# Patient Record
Sex: MALE | Race: OTHER | NOT HISPANIC OR LATINO | URBAN - METROPOLITAN AREA
[De-identification: names, ages, dates, MRNs, and addresses within clinical notes are randomized per-mention and may not be internally consistent; named-entity substitution may affect disease eponyms.]

---

## 2018-11-23 ENCOUNTER — INPATIENT (INPATIENT)
Facility: HOSPITAL | Age: 23
LOS: 2 days | Discharge: HOME | DRG: 286 | End: 2018-11-26
Attending: INTERNAL MEDICINE | Admitting: INTERNAL MEDICINE
Payer: COMMERCIAL

## 2018-11-23 VITALS
WEIGHT: 145.06 LBS | OXYGEN SATURATION: 99 % | TEMPERATURE: 98 F | RESPIRATION RATE: 18 BRPM | SYSTOLIC BLOOD PRESSURE: 114 MMHG | DIASTOLIC BLOOD PRESSURE: 73 MMHG | HEART RATE: 65 BPM

## 2018-11-23 DIAGNOSIS — I51.4 MYOCARDITIS, UNSPECIFIED: ICD-10-CM

## 2018-11-23 DIAGNOSIS — J02.0 STREPTOCOCCAL PHARYNGITIS: ICD-10-CM

## 2018-11-23 DIAGNOSIS — I50.31 ACUTE DIASTOLIC (CONGESTIVE) HEART FAILURE: ICD-10-CM

## 2018-11-23 DIAGNOSIS — I31.9 DISEASE OF PERICARDIUM, UNSPECIFIED: ICD-10-CM

## 2018-11-23 LAB
ALBUMIN SERPL ELPH-MCNC: 4.4 G/DL — SIGNIFICANT CHANGE UP (ref 3.3–5)
ALP SERPL-CCNC: 48 U/L — SIGNIFICANT CHANGE UP (ref 40–120)
ALT FLD-CCNC: 19 U/L — SIGNIFICANT CHANGE UP (ref 10–45)
ANION GAP SERPL CALC-SCNC: 12 MMOL/L — SIGNIFICANT CHANGE UP (ref 5–17)
AST SERPL-CCNC: 93 U/L — HIGH (ref 10–40)
BILIRUB SERPL-MCNC: 0.2 MG/DL — SIGNIFICANT CHANGE UP (ref 0.2–1.2)
BUN SERPL-MCNC: 14 MG/DL — SIGNIFICANT CHANGE UP (ref 7–23)
CALCIUM SERPL-MCNC: 9.1 MG/DL — SIGNIFICANT CHANGE UP (ref 8.4–10.5)
CHLORIDE SERPL-SCNC: 100 MMOL/L — SIGNIFICANT CHANGE UP (ref 96–108)
CK MB CFR SERPL CALC: 76.7 NG/ML — HIGH (ref 0–6.7)
CK SERPL-CCNC: 1051 U/L — HIGH (ref 30–200)
CO2 SERPL-SCNC: 25 MMOL/L — SIGNIFICANT CHANGE UP (ref 22–31)
CREAT SERPL-MCNC: 0.69 MG/DL — SIGNIFICANT CHANGE UP (ref 0.5–1.3)
D DIMER BLD IA.RAPID-MCNC: 178 NG/ML DDU — SIGNIFICANT CHANGE UP
GLUCOSE SERPL-MCNC: 138 MG/DL — HIGH (ref 70–99)
HCT VFR BLD CALC: 39.6 % — SIGNIFICANT CHANGE UP (ref 39–50)
HGB BLD-MCNC: 13.7 G/DL — SIGNIFICANT CHANGE UP (ref 13–17)
MCHC RBC-ENTMCNC: 30.6 PG — SIGNIFICANT CHANGE UP (ref 27–34)
MCHC RBC-ENTMCNC: 34.6 G/DL — SIGNIFICANT CHANGE UP (ref 32–36)
MCV RBC AUTO: 88.6 FL — SIGNIFICANT CHANGE UP (ref 80–100)
PCP SPEC-MCNC: SIGNIFICANT CHANGE UP
PCP SPEC-MCNC: SIGNIFICANT CHANGE UP
PLATELET # BLD AUTO: 252 K/UL — SIGNIFICANT CHANGE UP (ref 150–400)
POTASSIUM SERPL-MCNC: 4.1 MMOL/L — SIGNIFICANT CHANGE UP (ref 3.5–5.3)
POTASSIUM SERPL-SCNC: 4.1 MMOL/L — SIGNIFICANT CHANGE UP (ref 3.5–5.3)
PROT SERPL-MCNC: 8.1 G/DL — SIGNIFICANT CHANGE UP (ref 6–8.3)
RAPID RVP RESULT: SIGNIFICANT CHANGE UP
RBC # BLD: 4.47 M/UL — SIGNIFICANT CHANGE UP (ref 4.2–5.8)
RBC # FLD: 12.9 % — SIGNIFICANT CHANGE UP (ref 10.3–16.9)
S PYO AG SPEC QL IA: NEGATIVE — SIGNIFICANT CHANGE UP
SODIUM SERPL-SCNC: 137 MMOL/L — SIGNIFICANT CHANGE UP (ref 135–145)
TROPONIN T SERPL-MCNC: 0.96 NG/ML — CRITICAL HIGH (ref 0–0.01)
TROPONIN T SERPL-MCNC: 0.98 NG/ML — CRITICAL HIGH (ref 0–0.01)
WBC # BLD: 8.3 K/UL — SIGNIFICANT CHANGE UP (ref 3.8–10.5)
WBC # FLD AUTO: 8.3 K/UL — SIGNIFICANT CHANGE UP (ref 3.8–10.5)

## 2018-11-23 PROCEDURE — 99285 EMERGENCY DEPT VISIT HI MDM: CPT | Mod: 25

## 2018-11-23 PROCEDURE — 99223 1ST HOSP IP/OBS HIGH 75: CPT

## 2018-11-23 PROCEDURE — 93458 L HRT ARTERY/VENTRICLE ANGIO: CPT | Mod: 26

## 2018-11-23 PROCEDURE — 71046 X-RAY EXAM CHEST 2 VIEWS: CPT | Mod: 26

## 2018-11-23 PROCEDURE — 93306 TTE W/DOPPLER COMPLETE: CPT | Mod: 26

## 2018-11-23 RX ORDER — METOPROLOL TARTRATE 50 MG
12.5 TABLET ORAL
Qty: 0 | Refills: 0 | Status: DISCONTINUED | OUTPATIENT
Start: 2018-11-23 | End: 2018-11-24

## 2018-11-23 RX ORDER — INFLUENZA VIRUS VACCINE 15; 15; 15; 15 UG/.5ML; UG/.5ML; UG/.5ML; UG/.5ML
0.5 SUSPENSION INTRAMUSCULAR ONCE
Qty: 0 | Refills: 0 | Status: COMPLETED | OUTPATIENT
Start: 2018-11-23 | End: 2018-11-26

## 2018-11-23 RX ORDER — COLCHICINE 0.6 MG
0.6 TABLET ORAL DAILY
Qty: 0 | Refills: 0 | Status: DISCONTINUED | OUTPATIENT
Start: 2018-11-23 | End: 2018-11-26

## 2018-11-23 RX ORDER — IBUPROFEN 200 MG
600 TABLET ORAL THREE TIMES A DAY
Qty: 0 | Refills: 0 | Status: DISCONTINUED | OUTPATIENT
Start: 2018-11-23 | End: 2018-11-26

## 2018-11-23 RX ADMIN — Medication 1 TABLET(S): at 15:27

## 2018-11-23 RX ADMIN — Medication 600 MILLIGRAM(S): at 21:58

## 2018-11-23 RX ADMIN — Medication 600 MILLIGRAM(S): at 22:12

## 2018-11-23 RX ADMIN — Medication 0.6 MILLIGRAM(S): at 15:27

## 2018-11-23 RX ADMIN — Medication 600 MILLIGRAM(S): at 15:27

## 2018-11-23 NOTE — ED PROVIDER NOTE - CARE PLAN
Principal Discharge DX:	Elevated troponin Principal Discharge DX:	Elevated troponin  Secondary Diagnosis:	Myocarditis

## 2018-11-23 NOTE — PROGRESS NOTE ADULT - SUBJECTIVE AND OBJECTIVE BOX
Procedure: LHC, Radial band  Indication: NSTEMI  Complication: none    Result:  1) Normal coronary arteries  2) LVEDP 14        Plan: Medical management.

## 2018-11-23 NOTE — ED ADULT NURSE NOTE - NSIMPLEMENTINTERV_GEN_ALL_ED
Implemented All Universal Safety Interventions:  [place] to call system. Call bell, personal items and telephone within reach. Instruct patient to call for assistance. Room bathroom lighting operational. Non-slip footwear when patient is off stretcher. Physically safe environment: no spills, clutter or unnecessary equipment. Stretcher in lowest position, wheels locked, appropriate side rails in place.

## 2018-11-23 NOTE — ED PROVIDER NOTE - MEDICAL DECISION MAKING DETAILS
Chest pain, recent strep throat, currently on abx Chest pain, recent strep throat, currently on abx, low risk HEART score but concern for myocarditis and sx, labs drawn,  no hemodynamic stability at present.

## 2018-11-23 NOTE — H&P ADULT - ASSESSMENT
24 yo active male, former hookah smoker, FHX early MI (maternal great grandfather at 51yo), PMHx innocent heart murmur (dx at 10 yo), small PFO (dx at 9yo), with recent multiple episodes of strep throat infections. Pt had strep throat a few weeks ago and consumed 9 days of Augmentin course with resolution of symptoms. Pt then had recurrent symptoms including generalized fatigue, painful swallowing, and white exudates on his tonsils and tested positive for Strep on 11/20/18 and was started on a course of Azithromycin on 11/20/18. Pt has been taking Advil, Dayquil, and Nyquil PRN for symptoms. Pt developed chest pain independent of activity on 11/21/18 for 2 hours. [place] [facility] and had chest pain independent of activity during the flight and came straight to the ED from the airport. In [facility] ED /73. HR 65 RR 18 Temp 97.5 O2 sat 99% room air. EKG shows diffuse early repolarization with TWI in AVL, V1. Trop T 0.98. D dimer negative. CXR shows clear lungs. Rapid Strep A negative. Pt is being admitted to [facility] for presumed myocarditis with plan for cardiac catheterization to assess for CAD. 22 yo active male, former hookah smoker, FHX early MI (maternal great grandfather at 51yo), PMHx innocent heart murmur (dx at 10 yo), small PFO (dx at 11yo), with recent multiple episodes of strep throat infections. Pt had strep throat a few weeks ago and consumed 9 days of Augmentin course with resolution of symptoms. Pt then had recurrent symptoms including generalized fatigue, painful swallowing, and white exudates on his tonsils and tested positive for Strep on 11/20/18 and was started on a course of Azithromycin on 11/20/18. Pt has been taking Advil, Dayquil, and Nyquil PRN for symptoms. Pt developed chest pain independent of activity on 11/21/18 for 2 hours. Pt flew from [place] to [facility] on 11/23/18 and had chest pain independent of activity during the flight and came straight to the ED from the airport. In [facility] ED /73. HR 65 RR 18 Temp 97.5 O2 sat 99% room air. EKG shows diffuse early repolarization with TWI in AVL, V1. Trop T 0.98. D dimer negative. CXR shows clear lungs. Rapid Strep A negative. Pt is being admitted to [facility] for myopericarditis with and now s/p cardiac catheterization on 11/23/18 showing normal coronaries. Echo shows EF 50% and mild apical lateral mid to mid anterolateral wall hypokinesis, therefore Dr Shepard deemed him to have acute diastolic CHF. Pt has frequent PVCs and Lopressor 12.5mg BID started. Pt requires inpatient observation for telemetry monitoring for further arrhythmias.

## 2018-11-23 NOTE — H&P ADULT - PROBLEM SELECTOR PLAN 3
- Currently asymptomatic.  - Stop Azithromycin  - Start Augmentin 875/125mg BID x 10 days  - Rapid strep A negative but pt tested positive on 11/20/18. RVP negative. Pt afebrile, no leukocytosis

## 2018-11-23 NOTE — H&P ADULT - PROBLEM SELECTOR PLAN 2
- Stop Azithromycin  - Start Augmentin 875/125mg BID x 10 days  - Rapid strep A negative but pt tested positive on 11/20/18 - Currently asymptomatic.  - Stop Azithromycin  - Start Augmentin 875/125mg BID x 10 days  - Rapid strep A negative but pt tested positive on 11/20/18. RVP negative. Pt afebrile, no leukocytosis. - Currently asymptomatic.  - Stop Azithromycin  - Start Augmentin 875/125mg BID x 10 days  - Rapid strep A negative but pt tested positive on 11/20/18. RVP negative. Pt afebrile, no leukocytosis -As per Dr Shepard, given wall motion abnormalities pt is classified as having acute diastolic heart failure. Pt euvolemic, doesn't require diuretics at this time.   - Pt having frequent PVCs on telemetry. Started on Lopressor 12.5mg BID as pt's SBPs in 100s. If pt tolerates Lopressor can start Lisinopril 2.5mg or 5mg daily on 11/24/18 afternoon depending on BP

## 2018-11-23 NOTE — ED PROVIDER NOTE - OBJECTIVE STATEMENT
24 yo recently being treated for strep throat, on Zpak with chest pain starting 12 hours ago, pressure like sensation to mid chest, non radiating, occurs at rest and on exertion, no assoc diaphoresis, nausea. 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 sx started after TG dinner yesterday per pt assoc w headache. no rash. no early family hx of cardiac dz, drug use, leg swelling, otherwise PERC neg hx.

## 2018-11-23 NOTE — H&P ADULT - HISTORY OF PRESENT ILLNESS
24 yo male, former hookah smoker, PMHx innocent heart murmur with recent multiple episodes of strep throat infections. Pt had strep throat and consumed 9 days of Augmentin. Pt had recurrent 24 yo active male, former hookah smoker, FHX early MI (maternal great grandfather at 51yo), PMHx innocent heart murmur (dx at 10 yo), small PFO (dx at 11yo), with recent multiple episodes of strep throat infections. Pt had strep throat a few weeks ago and consumed 9 days of Augmentin course with resolution of symptoms. Pt then had recurrent symptoms including generalized fatigue, painful swallowing, and white exudates on his tonsils and tested positive for Strep on 11/20/18 and was started on a course of Azithromycin on 11/20/18. Pt has been taking Advil, Dayquil, and Nyquil PRN for symptoms. Pt developed chest pain independent of activity on 11/21/18 for 2 hours. [place] flew [place] to [facility] on 11/23/18 and had chest pain independent of activity during the flight and came straight to the ED from the airport. In [facility] ED /73. HR 65 RR 18 Temp 97.5 O2 sat 99% room air. EKG shows diffuse early repolarization with TWI in AVL, V1. Trop T 0.98. D dimer negative. CXR shows clear lungs. Rapid Strep A negative. Pt is being admitted to [facility] for presumed myocarditis with plan for cardiac catheterization to assess for CAD. 24 yo active male, former hookah smoker, FHX early MI (maternal great grandfather at 49yo), PMHx innocent heart murmur (dx at 10 yo), small PFO (dx at 11yo), with recent multiple episodes of strep throat infections. Pt had strep throat a few weeks ago and consumed 9 days of Augmentin course with resolution of symptoms. Pt then had recurrent symptoms including generalized fatigue, painful swallowing, and white exudates on his tonsils and tested positive for Strep on 11/20/18 and was started on a course of Azithromycin on 11/20/18. Pt has been taking Advil, Dayquil, and Nyquil PRN for symptoms. Pt developed chest pain independent of activity on 11/21/18 for 2 hours. [place] [facility] [place] and had chest pain independent of activity during the flight and came straight to the ED from the airport. In [facility] ED /73. HR 65 RR 18 Temp 97.5 O2 sat 99% room air. EKG shows diffuse early repolarization with TWI in AVL, V1. Trop T 0.98. D dimer negative. CXR shows clear lungs. Rapid Strep A negative. Pt is being admitted to [facility] for myopericarditis with and now s/p cardiac catheterization on 11/23/18 showing normal coronaries. Echo shows EF 50% and mild apical lateral mid to mid anterolateral wall hypokinesis, therefore Dr Shepard deemed him to have acute diastolic CHF. Pt has frequent PVCs and Lopressor 12.5mg BID started. Pt requires inpatient observation for telemetry monitoring for further arrhythmias.

## 2018-11-23 NOTE — ED ADULT NURSE NOTE - OBJECTIVE STATEMENT
Pt states " I had chest pressure on and off since yesterday. It's not pain, just pressure, SOB and racing heart on and off. I just got back from a flight. It's better now but it keeps going and coming. I am getting over a strep throat and is on z pack so I am not sure if that's the reason". Denies chest pain, n/v. Reports history of Anxiety.

## 2018-11-23 NOTE — H&P ADULT - ATTENDING COMMENTS
Reviewed PA documentation. Reviewed vitals, labs, medications, cardiac studies and imaging 11/23/18. Agree with the above with the following additions/amendments:  23M with PFO and h/o recurrent strep throat presents s/p recent culture +strep URI with new onset chest pain x 2 days. When probed, patient able to describe the chest pain as burning sub sternal, related to activity, relieved with rest or when laying on back or on the side, worsened with sitting up. EKG with diffuse SANDRA. Trop 0.98-->0.96, DDimer negative. CXR negative. Rapid strep neg. Utox negative.  LHC 11/23 reveals clean coronaries and LVEDP 14. Patient being managed as acute myopericarditis.     Ibuprofen 600 TID  Colchicine 0.6mg po daily (dose reduced as pt <70kg)  Metoprolol 12.5 po BID  -->pt with ectopy on telemetry, mildly depressed LVEF and WMA on echo  CTM on telemetry given ectopy  Anticipated discharge 11/24 pending clinical stability    [email or web address]  Cardiology Attending Reviewed PA documentation. Reviewed vitals, labs, medications, cardiac studies and imaging 11/23/18. Agree with the above with the following additions/amendments:  23M with PFO and h/o recurrent strep throat presents s/p recent culture +strep URI with new onset chest pain x 2 days. When probed, patient able to describe the chest pain as burning sub sternal, related to activity, relieved with rest or when laying on back or on the side, worsened with sitting up. EKG with diffuse SANDRA. Trop 0.98-->0.96, DDimer negative. CXR negative. Rapid strep neg. Utox negative.  LHC 11/23 reveals clean coronaries and LVEDP 14. Patient being managed as acute myopericarditis.     Ibuprofen 600 TID  Colchicine 0.6mg po daily (dose reduced as pt <70kg)  Metoprolol 12.5 po BID  -->pt with ectopy on telemetry, mildly depressed LVEF and WMA on echo  Will monitor on BB, then add low dose ACEi  CTM on telemetry given ectopy  Anticipated discharge in next 48-72 hours pending clinical stability    [facility]  Cardiology Attending

## 2018-11-23 NOTE — H&P ADULT - PROBLEM SELECTOR PLAN 1
- currently chest pain free  - trop T 0.98, f/u 1122am trop  - EKG NSR with diffuse J point elevation with TWI in AVL and V1.   - pt for cardiac catheterization on 11/23/18 with Dr Delaney. No load.   - Echo ordered, f/u results - currently chest pain free  - trop T 0.98, f/u 1122am trop  - EKG NSR with diffuse J point elevation with TWI in AVL and V1.   - pt for cardiac catheterization on 11/23/18 with Dr Delaney. No load.   - Echo 11/23/18 Normal LV size and wall thickness. Mild apical lateral mid to mid anterolateral wall hypokinesis. EF 50%. LA/RA/RV normal. Trace to mild TR. PASP 20mmHg. No pericardial effusion. - currently chest pain free  - trop T 0.98 > 0.96.   - EKG NSR with diffuse J point elevation with TWI in AVL and V1.   - pt for cardiac catheterization on 11/23/18 with Dr Delaney. No load.   - Echo 11/23/18 Normal LV size and wall thickness. Mild apical lateral mid to mid anterolateral wall hypokinesis. EF 50%. LA/RA/RV normal. Trace to mild TR. PASP 20mmHg. No pericardial effusion.   -pt s/p diagnostic cardiac cath on 11/23/18 showing normal coronaries. EDP 14mg. Right radial access  -As per Dr Shepard, given wall motion abnormalities pt is classified as having acute diastolic heart failure. Pt euvolemic, doesn't require diuretics at this time.   - Pt having frequent PVCs on telemetry. Started on Lopressor 12.5mg BID as pt's SBPs in 100s. If pt tolerates Lopressor can start Lisinopril 2.5mg or 5mg daily on 11/24/18 afternoon depending on BPs  -Start Ibuprofen 600mg TID and Colchicine 0.6mg daily (pt is less than 70kg).   - first utox (130pm) was indeterminate 2/2 urine pH being elevated. Repeat study ordered. - currently chest pain free  - trop T 0.98 > 0.96.   - EKG NSR with diffuse J point elevation with TWI in AVL and V1.   - pt for cardiac catheterization on 11/23/18 with Dr Delaney. No load.   - Echo 11/23/18 Normal LV size and wall thickness. Mild apical lateral mid to mid anterolateral wall hypokinesis. EF 50%. LA/RA/RV normal. Trace to mild TR. PASP 20mmHg. No pericardial effusion.   -pt s/p diagnostic cardiac cath on 11/23/18 showing normal coronaries. EDP 14mg. Right radial access  - first utox (130pm) was indeterminate 2/2 urine pH being elevated. Repeat study ordered.  -Start Ibuprofen 600mg TID and Colchicine 0.6mg daily (pt is less than 70kg).

## 2018-11-23 NOTE — ED PROVIDER NOTE - PHYSICAL EXAMINATION
CONSTITUTIONAL: Well appearing, well nourished, awake, alert and in no apparent distress.  HEENT: Head is atraumatic. Eyes clear bilaterally, normal EOMI. Airway patent.  CARDIAC: Normal rate, regular rhythm.  Heart sounds S1, S2.   RESPIRATORY: Breath sounds clear and equal bilaterally. no tachypnea, respiratory distress.   GASTROINTESTINAL: Abdomen soft, non-tender, no guarding, distension.  MUSCULOSKELETAL: Spine appears normal, no midline spinal tenderness, range of motion is not limited, no muscle or joint tenderness.    NEUROLOGICAL: Alert and oriented, no focal deficits, no motor or sensory deficits.  SKIN: Skin normal color for race, warm, dry and intact. No evidence of rash.  PSYCHIATRIC: Alert and oriented to person, place, time/situation. normal mood and affect. no apparent risk to self or others.

## 2018-11-23 NOTE — ED ADULT TRIAGE NOTE - CHIEF COMPLAINT QUOTE
pt. c/o midsternal chest pain and shortness of breath since yesterday, pt. just got here this morning from [place].

## 2018-11-24 DIAGNOSIS — I42.9 CARDIOMYOPATHY, UNSPECIFIED: ICD-10-CM

## 2018-11-24 LAB
ALBUMIN SERPL ELPH-MCNC: 4.2 G/DL — SIGNIFICANT CHANGE UP (ref 3.3–5)
ALP SERPL-CCNC: 44 U/L — SIGNIFICANT CHANGE UP (ref 40–120)
ALT FLD-CCNC: 27 U/L — SIGNIFICANT CHANGE UP (ref 10–45)
ANION GAP SERPL CALC-SCNC: 9 MMOL/L — SIGNIFICANT CHANGE UP (ref 5–17)
AST SERPL-CCNC: 139 U/L — HIGH (ref 10–40)
BILIRUB SERPL-MCNC: 0.4 MG/DL — SIGNIFICANT CHANGE UP (ref 0.2–1.2)
BUN SERPL-MCNC: 15 MG/DL — SIGNIFICANT CHANGE UP (ref 7–23)
CALCIUM SERPL-MCNC: 9.2 MG/DL — SIGNIFICANT CHANGE UP (ref 8.4–10.5)
CHLORIDE SERPL-SCNC: 105 MMOL/L — SIGNIFICANT CHANGE UP (ref 96–108)
CO2 SERPL-SCNC: 27 MMOL/L — SIGNIFICANT CHANGE UP (ref 22–31)
CREAT SERPL-MCNC: 0.71 MG/DL — SIGNIFICANT CHANGE UP (ref 0.5–1.3)
GLUCOSE SERPL-MCNC: 106 MG/DL — HIGH (ref 70–99)
HCT VFR BLD CALC: 39.5 % — SIGNIFICANT CHANGE UP (ref 39–50)
HGB BLD-MCNC: 13.2 G/DL — SIGNIFICANT CHANGE UP (ref 13–17)
MAGNESIUM SERPL-MCNC: 2.5 MG/DL — SIGNIFICANT CHANGE UP (ref 1.6–2.6)
MCHC RBC-ENTMCNC: 30 PG — SIGNIFICANT CHANGE UP (ref 27–34)
MCHC RBC-ENTMCNC: 33.4 G/DL — SIGNIFICANT CHANGE UP (ref 32–36)
MCV RBC AUTO: 89.8 FL — SIGNIFICANT CHANGE UP (ref 80–100)
PLATELET # BLD AUTO: 304 K/UL — SIGNIFICANT CHANGE UP (ref 150–400)
POTASSIUM SERPL-MCNC: 4.3 MMOL/L — SIGNIFICANT CHANGE UP (ref 3.5–5.3)
POTASSIUM SERPL-SCNC: 4.3 MMOL/L — SIGNIFICANT CHANGE UP (ref 3.5–5.3)
PROT SERPL-MCNC: 7.1 G/DL — SIGNIFICANT CHANGE UP (ref 6–8.3)
RBC # BLD: 4.4 M/UL — SIGNIFICANT CHANGE UP (ref 4.2–5.8)
RBC # FLD: 13 % — SIGNIFICANT CHANGE UP (ref 10.3–16.9)
SODIUM SERPL-SCNC: 141 MMOL/L — SIGNIFICANT CHANGE UP (ref 135–145)
WBC # BLD: 4.7 K/UL — SIGNIFICANT CHANGE UP (ref 3.8–10.5)
WBC # FLD AUTO: 4.7 K/UL — SIGNIFICANT CHANGE UP (ref 3.8–10.5)

## 2018-11-24 PROCEDURE — 99233 SBSQ HOSP IP/OBS HIGH 50: CPT

## 2018-11-24 RX ORDER — LISINOPRIL 2.5 MG/1
2.5 TABLET ORAL DAILY
Qty: 0 | Refills: 0 | Status: DISCONTINUED | OUTPATIENT
Start: 2018-11-24 | End: 2018-11-24

## 2018-11-24 RX ORDER — METOPROLOL TARTRATE 50 MG
12.5 TABLET ORAL
Qty: 0 | Refills: 0 | Status: DISCONTINUED | OUTPATIENT
Start: 2018-11-24 | End: 2018-11-25

## 2018-11-24 RX ADMIN — Medication 600 MILLIGRAM(S): at 22:29

## 2018-11-24 RX ADMIN — Medication 600 MILLIGRAM(S): at 06:06

## 2018-11-24 RX ADMIN — Medication 600 MILLIGRAM(S): at 21:29

## 2018-11-24 RX ADMIN — Medication 12.5 MILLIGRAM(S): at 14:05

## 2018-11-24 RX ADMIN — Medication 1 TABLET(S): at 06:06

## 2018-11-24 RX ADMIN — Medication 0.6 MILLIGRAM(S): at 12:15

## 2018-11-24 RX ADMIN — Medication 12.5 MILLIGRAM(S): at 22:09

## 2018-11-24 RX ADMIN — Medication 600 MILLIGRAM(S): at 14:02

## 2018-11-24 RX ADMIN — Medication 1 TABLET(S): at 18:19

## 2018-11-24 RX ADMIN — Medication 600 MILLIGRAM(S): at 16:19

## 2018-11-24 RX ADMIN — Medication 600 MILLIGRAM(S): at 08:21

## 2018-11-24 NOTE — PROGRESS NOTE ADULT - SUBJECTIVE AND OBJECTIVE BOX
Interventional Cardiology PA Adult Progress Note    CC: "Chest pain"  Subjective Assessment: Patient seen and examined at bedside this morning. Pt reports mild chest discomfort overnight which was relieved with Ibuprofen. Patient currently denies any active chest pain, SOB, palpitations, dizziness, fever, chills, sore throat, nasal congestion, body aches.    12 Point review of systems otherwise negative except per subjective.       	  MEDICATIONS:  lisinopril 2.5 milliGRAM(s) Oral daily  metoprolol tartrate 12.5 milliGRAM(s) Oral two times a day  amoxicillin  875 milliGRAM(s)/clavulanate 1 Tablet(s) Oral two times a day  ibuprofen  Tablet. 600 milliGRAM(s) Oral three times a day  colchicine 0.6 milliGRAM(s) Oral daily  influenza   Vaccine 0.5 milliLiter(s) IntraMuscular once    [PHYSICAL EXAM:    T(C): 36.7 (11-24-18 @ 09:41), Max: 37.2 (11-23-18 @ 19:06)  HR: 90 (11-24-18 @ 08:07) (61 - 90)  BP: 120/67 (11-24-18 @ 08:07) (85/49 - 120/67)  RR: 16 (11-24-18 @ 08:07) (16 - 18)  SpO2: 98% (11-24-18 @ 08:07) (97% - 98%)  Wt(kg): --  I&O's Summary    23 Nov 2018 07:01  -  24 Nov 2018 07:00  --------------------------------------------------------  IN: 420 mL / OUT: 0 mL / NET: 420 mL    24 Nov 2018 07:01  -  24 Nov 2018 11:21  --------------------------------------------------------  IN: 120 mL / OUT: 0 mL / NET: 120 mL      Height (cm): 170.18 (11-23 @ 12:15)  Weight (kg): 65.7 (11-23 @ 12:15)  BMI (kg/m2): 22.7 (11-23 @ 12:15)  BSA (m2): 1.76 (11-23 @ 12:15)      Gen: NAD, resting comfortable in bed  Neck: No JVD b/l  Cardiac: +S1, S2, RRR  Pulm: CTA b/l  Abdomen: BS present, soft, NT, ND  Extremities: Right radial: no hematoma, no bleed, 2+ radial pulse. No C/C/E b/l, warm to touch  Neuro: A+Ox3, no acute deficits     LABS:	 	                                    13.2   4.7   )-----------( 304      ( 24 Nov 2018 06:05 )             39.5     11-24    141  |  105  |  15  ----------------------------<  106<H>  4.3   |  27  |  0.71    Ca    9.2      24 Nov 2018 06:05  Mg     2.5     11-24    TPro  7.1  /  Alb  4.2  /  TBili  0.4  /  DBili  x   /  AST  139<H>  /  ALT  27  /  AlkPhos  44  11-24          TSH: Thyroid Stimulating Hormone, Serum: 0.804 uIU/mL (11-23 @ 11:22) Interventional Cardiology PA Adult Progress Note    CC: Chest pain, "I want to leave hospital today"  Subjective Assessment: Patient seen and examined at bedside this morning. Pt reports mild chest discomfort overnight which was relieved with Ibuprofen. Patient currently denies any active chest pain, SOB, palpitations, dizziness, fever, chills, sore throat, nasal congestion, body aches.    12 Point review of systems otherwise negative except per subjective.       	  MEDICATIONS:  lisinopril 2.5 milliGRAM(s) Oral daily  metoprolol tartrate 12.5 milliGRAM(s) Oral two times a day  amoxicillin  875 milliGRAM(s)/clavulanate 1 Tablet(s) Oral two times a day  ibuprofen  Tablet. 600 milliGRAM(s) Oral three times a day  colchicine 0.6 milliGRAM(s) Oral daily  influenza   Vaccine 0.5 milliLiter(s) IntraMuscular once    [PHYSICAL EXAM:    T(C): 36.7 (11-24-18 @ 09:41), Max: 37.2 (11-23-18 @ 19:06)  HR: 90 (11-24-18 @ 08:07) (61 - 90)  BP: 120/67 (11-24-18 @ 08:07) (85/49 - 120/67)  RR: 16 (11-24-18 @ 08:07) (16 - 18)  SpO2: 98% (11-24-18 @ 08:07) (97% - 98%)  Wt(kg): --  I&O's Summary    23 Nov 2018 07:01  -  24 Nov 2018 07:00  --------------------------------------------------------  IN: 420 mL / OUT: 0 mL / NET: 420 mL    24 Nov 2018 07:01  -  24 Nov 2018 11:21  --------------------------------------------------------  IN: 120 mL / OUT: 0 mL / NET: 120 mL      Height (cm): 170.18 (11-23 @ 12:15)  Weight (kg): 65.7 (11-23 @ 12:15)  BMI (kg/m2): 22.7 (11-23 @ 12:15)  BSA (m2): 1.76 (11-23 @ 12:15)      Gen: NAD, resting comfortable in bed  Neck: No JVD b/l  Cardiac: +S1, S2, RRR  Pulm: CTA b/l  Abdomen: BS present, soft, NT, ND  Extremities: Right radial: no hematoma, no bleed, 2+ radial pulse. No C/C/E b/l, warm to touch  Neuro: A+Ox3, no acute deficits     LABS:	 	                                    13.2   4.7   )-----------( 304      ( 24 Nov 2018 06:05 )             39.5     11-24    141  |  105  |  15  ----------------------------<  106<H>  4.3   |  27  |  0.71    Ca    9.2      24 Nov 2018 06:05  Mg     2.5     11-24    TPro  7.1  /  Alb  4.2  /  TBili  0.4  /  DBili  x   /  AST  139<H>  /  ALT  27  /  AlkPhos  44  11-24          TSH: Thyroid Stimulating Hormone, Serum: 0.804 uIU/mL (11-23 @ 11:22)

## 2018-11-24 NOTE — PROGRESS NOTE ADULT - PROBLEM SELECTOR PLAN 3
- Currently asymptomatic.  -Was prescribed Azithromycin as outpatient which has been discontinued and Augmentin 875/125 mg BID x 10 days initiated (11/23)  - Rapid strep A negative but pt tested positive on 11/20/18. RVP negative. BCx NGTD.   -Pt afebrile, no leukocytosis.       Dispo: Awaiting Cardiac MRI. Anticipate discharge in 1-2 days as discussed with Dr. Shepard.    Patient and family updated on plan of care. Case discussed with Dr. Shepard - Currently asymptomatic.  -Was prescribed Azithromycin as outpatient which has been discontinued and Augmentin 875/125 mg BID x 10 days initiated (11/23)  - Rapid strep A negative but pt tested positive on 11/20/18. RVP negative. BCx NGTD.   -Group A Streptococcus culture results pending  -Pt afebrile, no leukocytosis.       Dispo: Awaiting Cardiac MRI. Anticipate discharge in 1-2 days as discussed with Dr. Shepard.    Patient and family updated on plan of care. Case discussed with Dr. Shepard

## 2018-11-24 NOTE — PROGRESS NOTE ADULT - PROBLEM SELECTOR PLAN 1
-Currently chest pain free. Did report mild chest discomfort overnight  -Peak troponin 0.98.   - Echo 11/23/18: Mild apical lateral mid to mid anterolateral wall hypokinesis. EF 50%. Trace to mild TR. No pericardial effusion.   -s/p diagnostic cardiac cath 11/23/18: normal coronaries. EDP 14mg via right radial access (pt with NSVT during procedure)  -Ectopy on Telemetry: NSVT   -Plan for Cardiac MRI today.   -Continue Ibuprofen 600 mg orally TID, Colchicine 0.6 mg daily (pt is less than 70 kg day).  -Continue Lopressor 12.5 mg orally BID (will transition to Toprol XL 25 mg daily tomorrow). Lisinopril 2.5 mg initiated today. Will monitor BP trend. -Currently chest pain free. Did report mild chest discomfort overnight  -Peak troponin 0.98.   - Echo 11/23/18: Mild apical lateral mid to mid anterolateral wall hypokinesis. EF 50%. Trace to mild TR. No pericardial effusion.   -s/p diagnostic cardiac cath 11/23/18: normal coronaries. EDP 14mg via right radial access (pt with NSVT during procedure)  -Ectopy on Telemetry: NSVT   -Plan for Cardiac MRI today.   -Continue Ibuprofen 600 mg orally TID, Colchicine 0.6 mg daily (pt is less than 70 kg day).  -Continue Lopressor 12.5 mg orally BID (will transition to Toprol XL 25 mg daily tomorrow if BP tolerates). Will likely initiate low dose Lisinopril 2.5 mg daily pending trending in BP in AM

## 2018-11-24 NOTE — PROGRESS NOTE ADULT - ATTENDING COMMENTS
Reviewed PA documentation. Reviewed vitals, labs, medications, cardiac studies and imaging 11/24/18. Agree with the above with the following additions/amendments:  23M with h/o PFO and h/o recurrent strep throat presents with new onset chest pain x 2 days in context of culture +strep URI . Chest pain is substernal, burning, related to exertional activity, relieved with rest or when laying on back or on the side, worsened with sitting up. EKG with diffuse SNADRA. Trop 0.98-->0.96, DDimer negative. CXR negative. Inpatient rapid strep neg. Utox negative.  LHC 11/23 revealed clean coronaries and LVEDP 14.  TTE with multiple wall motion segmental abnormalities and mildly reduced LVEF 50%.    Patient being managed as acute myopericarditis c/b cardiomyopathy and non sustained ventricular tachycardia, as noted on continuous telemetry monitoring.     NPO for cMRI planned for this afternoon  Ibuprofen 600 TID  Colchicine 0.6mg po daily (dose reduced in pt <70kg)  Metoprolol 12.5 po BID, CTM BPs while on BB  Initiate Lisinopril 2.5mg po daily when NPO status is lifted  CTM on telemetry given NSVT  Anticipated discharge 11/26 at earliest pending clinical/electrical stability     [facility]  Cardiology Attending

## 2018-11-24 NOTE — PROGRESS NOTE ADULT - PROBLEM SELECTOR PLAN 2
-As per Dr Shepard, given wall motion abnormalities and EF 50%, pt is classified as having acute diastolic heart failure. Pt euvolemic, doesn't require diuretics at this time. -Cardiomyopathy secondary myopericarditis   -As per Dr Shepard, given wall motion abnormalities and EF 50%, pt is classified as having acute diastolic heart failure. Pt euvolemic, doesn't require diuretics at this time. 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 Pt euvolemic, doesn't require diuretics at this time. 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 Pt euvolemic, doesn't require diuretics at this time.  -Low dose BB therapy, will likely initiated low dose ACEI therapy in AM pending BP trend.

## 2018-11-25 LAB
ANION GAP SERPL CALC-SCNC: 14 MMOL/L — SIGNIFICANT CHANGE UP (ref 5–17)
BUN SERPL-MCNC: 20 MG/DL — SIGNIFICANT CHANGE UP (ref 7–23)
CALCIUM SERPL-MCNC: 9 MG/DL — SIGNIFICANT CHANGE UP (ref 8.4–10.5)
CHLORIDE SERPL-SCNC: 102 MMOL/L — SIGNIFICANT CHANGE UP (ref 96–108)
CO2 SERPL-SCNC: 25 MMOL/L — SIGNIFICANT CHANGE UP (ref 22–31)
CREAT SERPL-MCNC: 0.69 MG/DL — SIGNIFICANT CHANGE UP (ref 0.5–1.3)
GLUCOSE SERPL-MCNC: 97 MG/DL — SIGNIFICANT CHANGE UP (ref 70–99)
HCT VFR BLD CALC: 38 % — LOW (ref 39–50)
HGB BLD-MCNC: 12.4 G/DL — LOW (ref 13–17)
MAGNESIUM SERPL-MCNC: 2.4 MG/DL — SIGNIFICANT CHANGE UP (ref 1.6–2.6)
MCHC RBC-ENTMCNC: 29.4 PG — SIGNIFICANT CHANGE UP (ref 27–34)
MCHC RBC-ENTMCNC: 32.6 G/DL — SIGNIFICANT CHANGE UP (ref 32–36)
MCV RBC AUTO: 90 FL — SIGNIFICANT CHANGE UP (ref 80–100)
PLATELET # BLD AUTO: 321 K/UL — SIGNIFICANT CHANGE UP (ref 150–400)
POTASSIUM SERPL-MCNC: 4 MMOL/L — SIGNIFICANT CHANGE UP (ref 3.5–5.3)
POTASSIUM SERPL-SCNC: 4 MMOL/L — SIGNIFICANT CHANGE UP (ref 3.5–5.3)
RBC # BLD: 4.22 M/UL — SIGNIFICANT CHANGE UP (ref 4.2–5.8)
RBC # FLD: 13.1 % — SIGNIFICANT CHANGE UP (ref 10.3–16.9)
SODIUM SERPL-SCNC: 141 MMOL/L — SIGNIFICANT CHANGE UP (ref 135–145)
WBC # BLD: 5.5 K/UL — SIGNIFICANT CHANGE UP (ref 3.8–10.5)
WBC # FLD AUTO: 5.5 K/UL — SIGNIFICANT CHANGE UP (ref 3.8–10.5)

## 2018-11-25 PROCEDURE — 75561 CARDIAC MRI FOR MORPH W/DYE: CPT | Mod: 26

## 2018-11-25 PROCEDURE — 99233 SBSQ HOSP IP/OBS HIGH 50: CPT

## 2018-11-25 RX ORDER — LISINOPRIL 2.5 MG/1
2.5 TABLET ORAL DAILY
Qty: 0 | Refills: 0 | Status: DISCONTINUED | OUTPATIENT
Start: 2018-11-25 | End: 2018-11-26

## 2018-11-25 RX ORDER — METOPROLOL TARTRATE 50 MG
6.25 TABLET ORAL
Qty: 0 | Refills: 0 | Status: DISCONTINUED | OUTPATIENT
Start: 2018-11-25 | End: 2018-11-26

## 2018-11-25 RX ORDER — SODIUM CHLORIDE 9 MG/ML
1000 INJECTION INTRAMUSCULAR; INTRAVENOUS; SUBCUTANEOUS
Qty: 0 | Refills: 0 | Status: DISCONTINUED | OUTPATIENT
Start: 2018-11-25 | End: 2018-11-26

## 2018-11-25 RX ADMIN — Medication 1 TABLET(S): at 06:03

## 2018-11-25 RX ADMIN — Medication 12.5 MILLIGRAM(S): at 06:03

## 2018-11-25 RX ADMIN — Medication 1 TABLET(S): at 17:12

## 2018-11-25 RX ADMIN — Medication 6.25 MILLIGRAM(S): at 17:36

## 2018-11-25 RX ADMIN — SODIUM CHLORIDE 75 MILLILITER(S): 9 INJECTION INTRAMUSCULAR; INTRAVENOUS; SUBCUTANEOUS at 09:25

## 2018-11-25 RX ADMIN — Medication 600 MILLIGRAM(S): at 06:03

## 2018-11-25 RX ADMIN — Medication 600 MILLIGRAM(S): at 14:07

## 2018-11-25 RX ADMIN — LISINOPRIL 2.5 MILLIGRAM(S): 2.5 TABLET ORAL at 21:26

## 2018-11-25 RX ADMIN — Medication 0.6 MILLIGRAM(S): at 11:50

## 2018-11-25 RX ADMIN — Medication 600 MILLIGRAM(S): at 22:26

## 2018-11-25 RX ADMIN — Medication 600 MILLIGRAM(S): at 16:23

## 2018-11-25 RX ADMIN — Medication 600 MILLIGRAM(S): at 07:03

## 2018-11-25 RX ADMIN — Medication 600 MILLIGRAM(S): at 21:26

## 2018-11-25 NOTE — PROGRESS NOTE ADULT - PROBLEM SELECTOR PLAN 1
-Currently chest pain free. Did report mild chest discomfort overnight  -Peak troponin 0.98.   - Echo 11/23/18: Mild apical lateral mid to mid anterolateral wall hypokinesis. EF 50%. Trace to mild TR. No pericardial effusion.   -s/p diagnostic cardiac cath 11/23/18: normal coronaries. EDP 14mg via right radial access (pt with NSVT during procedure)  -Ectopy on Telemetry: NSVT   -Plan for Cardiac MRI today. f/u MRI  -Continue Ibuprofen 600 mg orally TID, Colchicine 0.6 mg daily (pt is less than 70 kg day).  -Continue Lopressor 12.5 mg orally BID (will transition to Toprol XL 25 mg daily tomorrow if BP tolerates).   - pt. to be started on lisnopril 2.5 mg today evening if BP stable -Currently chest pain free. Did report mild chest discomfort overnight  -Peak troponin 0.98.   - Echo 11/23/18: Mild apical lateral mid to mid anterolateral wall hypokinesis. EF 50%. Trace to mild TR. No pericardial effusion.   -s/p diagnostic cardiac cath 11/23/18: normal coronaries. EDP 14mg via right radial access (pt with NSVT during procedure)  -Ectopy on Telemetry: NSVT   -Plan for Cardiac MRI today. f/u MRI  -Continue Ibuprofen 600 mg orally TID, Colchicine 0.6 mg daily (pt is less than 70 kg day).  - Lopressor 12.5 mg orally BID decreased to 6.25 mg BID.   - pt. to be started on lisnopril 2.5 mg today evening if BP stable

## 2018-11-25 NOTE — PROGRESS NOTE ADULT - PROBLEM SELECTOR PLAN 3
- Currently asymptomatic.  -Was prescribed Azithromycin as outpatient which has been discontinued and Augmentin 875/125 mg BID x 10 days initiated (11/23)  - Rapid strep A negative but pt tested positive on 11/20/18. RVP negative. BCx NGTD.   -Group A Streptococcus culture results pending  -Pt afebrile, no leukocytosis.     IVF NS @ 75 cc/h X 10 hrs today as pt. has been NPO for past few days.   Dispo:  f/u cardiac MRI    Patient and family updated on plan of care. Case discussed with Dr. Shepard

## 2018-11-25 NOTE — PROGRESS NOTE ADULT - SUBJECTIVE AND OBJECTIVE BOX
Interventional Cardiology PA Adult Progress Note    CC: chest pain  Subjective Assessment: Pt. seen and examined at bedside today am. Pt. sleeping comfortably, denies any CP, SOB, dizziness and palpitations.    12 ROS negative except as per subjective HPI.   	  MEDICATIONS:  metoprolol tartrate 12.5 milliGRAM(s) Oral two times a day    amoxicillin  875 milliGRAM(s)/clavulanate 1 Tablet(s) Oral two times a day      ibuprofen  Tablet. 600 milliGRAM(s) Oral three times a day      colchicine 0.6 milliGRAM(s) Oral daily    influenza   Vaccine 0.5 milliLiter(s) IntraMuscular once      	    [PHYSICAL EXAM:  TELEMETRY:  T(C): 37 (11-25-18 @ 07:08), Max: 37.4 (11-24-18 @ 18:06)  HR: 70 (11-25-18 @ 08:54) (64 - 76)  BP: 93/54 (11-25-18 @ 08:54) (90/50 - 105/51)  RR: 16 (11-25-18 @ 08:54) (16 - 16)  SpO2: 95% (11-25-18 @ 08:54) (95% - 98%)  Wt(kg): --  I&O's Summary    24 Nov 2018 07:01  -  25 Nov 2018 07:00  --------------------------------------------------------  IN: 650 mL / OUT: 0 mL / NET: 650 mL        Mcnamara:  Central/PICC/Mid Line:                                         General: NAD  Neck: no JVD noted  CV: RRR, s1 and s2 positive, no murmurs noted  Pulm: CTA B/L, no w/r/r  GI: soft, NT/ND, + BS X 4  extremities: no clubbing, cyanosis and edema noted b/l  Neuro: AO X 3, no focal deficits noted      	    ECG:  	  RADIOLOGY:   DIAGNOSTIC TESTING:  [ ] Echocardiogram:  [ ]  Catheterization:  [ ] Stress Test:    [ ] RIO  OTHER: 	    LABS:	 	  CARDIAC MARKERS:                                  12.4   5.5   )-----------( 321      ( 25 Nov 2018 06:50 )             38.0     11-25    141  |  102  |  20  ----------------------------<  97  4.0   |  25  |  0.69    Ca    9.0      25 Nov 2018 06:50  Mg     2.4     11-25    TPro  7.1  /  Alb  4.2  /  TBili  0.4  /  DBili  x   /  AST  139<H>  /  ALT  27  /  AlkPhos  44  11-24    proBNP:   Lipid Profile:   HgA1c:   TSH:       ASSESSMENT/PLAN: 	        DVT ppx:  Dispo:

## 2018-11-25 NOTE — PROGRESS NOTE ADULT - ATTENDING COMMENTS
Reviewed PA documentation. Reviewed vitals, labs, medications, cardiac studies and imaging 11/24/18. Agree with the above with the following additions/amendments:  23M with h/o PFO and h/o recurrent strep throat presents with new onset chest pain x 2 days in context of culture +strep URI . Chest pain is substernal, burning, related to exertional activity, relieved with rest or when laying on back or on the side, worsened with sitting up. EKG with diffuse SANDRA. Trop 0.98-->0.96, DDimer negative. CXR negative. Inpatient rapid strep neg. Utox negative.  LHC 11/23 revealed clean coronaries and LVEDP 14.  TTE with multiple wall motion segmental abnormalities and mildly reduced LVEF 50%.    Telemetry reviewed: 4bts NSVT, 3bts NSVT  Patient being managed as acute myopericarditis c/b cardiomyopathy and non sustained ventricular tachycardia, as noted on continuous telemetry monitoring. cMRI was rescheduled from yesterday to today 11/25.    NPO for cMRI planned for this morning   Ibuprofen 600 TID  Colchicine 0.6mg po daily (dose reduced in pt <70kg)  Metoprolol 12.5 po BID, CTM BPs while on BB  -->Will discharge on Toprol 12.5XL daily  Initiate Lisinopril 2.5mg po daily when NPO status is lifted this afternoon  Trend LFTs given elevation in transaminases  Gentle IVFs 75cc/hr given NPO x 3D  CTM on telemetry  Anticipated discharge 11/26PM at earliest pending clinical/electrical stability     [facility]  [facility] Attending Reviewed PA documentation. Reviewed vitals, labs, medications, cardiac studies and imaging 11/24/18. Agree with the above with the following additions/amendments:  23M with h/o PFO and h/o recurrent strep throat presents with new onset chest pain x 2 days in context of culture +strep URI . Chest pain is substernal, burning, related to exertional activity, relieved with rest or when laying on back or on the side, worsened with sitting up. EKG with diffuse SANDRA. Trop 0.98-->0.96, DDimer negative. CXR negative. Inpatient rapid strep neg. Utox negative.  LHC 11/23 revealed clean coronaries and LVEDP 14.  TTE with multiple wall motion segmental abnormalities and mildly reduced LVEF 50%.    Telemetry reviewed: 4bts NSVT, 3bts NSVT  Patient being managed as acute myopericarditis c/b cardiomyopathy and non sustained ventricular tachycardia, as noted on continuous telemetry monitoring. cMRI was rescheduled from yesterday to today 11/25.    NPO for cMRI planned for this morning   Ibuprofen 600 TID  Colchicine 0.6mg po daily (dose reduced in pt <70kg)  Dose reduce to Metoprolol 6.25 BID, CTM BPs while on BB  -->Will discharge on Toprol 12.5XL daily  Initiate Lisinopril 2.5mg po daily when NPO status is lifted this afternoon  Trend LFTs given elevation in transaminases  Gentle IVFs 75cc/hr given NPO x 3D  CTM on telemetry  Anticipated discharge 11/26PM at earliest pending clinical/electrical stability     [facility]  Cardiology Attending Reviewed PA documentation. Reviewed vitals, labs, medications, cardiac studies and imaging 11/25/18. Agree with the above with the following additions/amendments:  23M with h/o PFO and h/o recurrent strep throat presents with new onset chest pain x 2 days in context of culture +strep URI . Chest pain is substernal, burning, related to exertional activity, relieved with rest or when laying on back or on the side, worsened with sitting up. EKG with diffuse SANDRA. Trop 0.98-->0.96, DDimer negative. CXR negative. Inpatient rapid strep neg. Utox negative.  LHC 11/23 revealed clean coronaries and LVEDP 14.  TTE with multiple wall motion segmental abnormalities and mildly reduced LVEF 50%.    Telemetry reviewed: 4bts NSVT, 3bts NSVT  Patient being managed as acute myopericarditis c/b cardiomyopathy and non sustained ventricular tachycardia, as noted on continuous telemetry monitoring. cMRI was rescheduled from yesterday to today 11/25.    NPO for cMRI planned for this morning   Ibuprofen 600 TID  Colchicine 0.6mg po daily (dose reduced in pt <70kg)  Dose reduce to Metoprolol 6.25 BID, CTM BPs while on BB  -->Will discharge on Toprol 12.5XL daily  Initiate Lisinopril 2.5mg po daily when NPO status is lifted this afternoon  Trend LFTs given elevation in transaminases  Gentle IVFs 75cc/hr given NPO x 3D  CTM on telemetry  Anticipated discharge 11/26PM at earliest pending clinical/electrical stability     [facility]  Cardiology Attending

## 2018-11-25 NOTE — PROGRESS NOTE ADULT - ASSESSMENT
23 male, visiting from [place], with a hx of PFO, recurrent strep throat s/p recent culture +strep presented to [facility] ED (11/23) with new onset chest pain x two days. 12 lead EKG revealed diffuse SANDRA, peak troponin 0.98, Rapid strep neg and C 11/23 revealed clean coronaries, LVEDP 14 mmHG. Pt is currently being managed for acute myopericarditis and is awaiting cardiac MRI.
23 male, visiting from [place], with a hx of PFO, recurrent strep throat s/p recent culture +strep presented to [facility] ED (11/23) with new onset chest pain x two days. 12 lead EKG revealed diffuse SANDRA, peak troponin 0.98, Rapid strep neg and C 11/23 revealed clean coronaries, LVEDP 14 mmHG. Pt is currently being managed for acute myopericarditis and is awaiting cardiac MRI.

## 2018-11-25 NOTE — PROGRESS NOTE ADULT - PROBLEM SELECTOR PLAN 2
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 Pt euvolemic, doesn't require diuretics at this time.  -Low dose BB therapy  - - pt. to be started on lisnopril 2.5 mg today evening if BP stable -Cardiomyopathy secondary to myopericarditis   Pt euvolemic, doesn't require diuretics at this time.  -Low dose BB therapy  - - pt. to be started on lisnopril 2.5 mg today evening if BP stable

## 2018-11-25 NOTE — PROGRESS NOTE ADULT - NSHPATTENDINGPLANDISCUSS_GEN_ALL_CORE
the patient, his family, and 5U care team
the patient, his mother and extended family, and 5U care team

## 2018-11-26 ENCOUNTER — TRANSCRIPTION ENCOUNTER (OUTPATIENT)
Age: 23
End: 2018-11-26

## 2018-11-26 ENCOUNTER — INBOUND DOCUMENT (OUTPATIENT)
Age: 23
End: 2018-11-26

## 2018-11-26 VITALS — TEMPERATURE: 98 F

## 2018-11-26 PROBLEM — Z00.00 ENCOUNTER FOR PREVENTIVE HEALTH EXAMINATION: Status: ACTIVE | Noted: 2018-11-26

## 2018-11-26 PROBLEM — J02.0 STREPTOCOCCAL PHARYNGITIS: Chronic | Status: ACTIVE | Noted: 2018-11-23

## 2018-11-26 LAB
ANION GAP SERPL CALC-SCNC: 9 MMOL/L — SIGNIFICANT CHANGE UP (ref 5–17)
BUN SERPL-MCNC: 23 MG/DL — SIGNIFICANT CHANGE UP (ref 7–23)
CALCIUM SERPL-MCNC: 8.7 MG/DL — SIGNIFICANT CHANGE UP (ref 8.4–10.5)
CHLORIDE SERPL-SCNC: 107 MMOL/L — SIGNIFICANT CHANGE UP (ref 96–108)
CO2 SERPL-SCNC: 24 MMOL/L — SIGNIFICANT CHANGE UP (ref 22–31)
CREAT SERPL-MCNC: 0.68 MG/DL — SIGNIFICANT CHANGE UP (ref 0.5–1.3)
GLUCOSE SERPL-MCNC: 108 MG/DL — HIGH (ref 70–99)
HCT VFR BLD CALC: 36 % — LOW (ref 39–50)
HGB BLD-MCNC: 11.8 G/DL — LOW (ref 13–17)
MAGNESIUM SERPL-MCNC: 2.3 MG/DL — SIGNIFICANT CHANGE UP (ref 1.6–2.6)
MCHC RBC-ENTMCNC: 29.8 PG — SIGNIFICANT CHANGE UP (ref 27–34)
MCHC RBC-ENTMCNC: 32.8 G/DL — SIGNIFICANT CHANGE UP (ref 32–36)
MCV RBC AUTO: 90.9 FL — SIGNIFICANT CHANGE UP (ref 80–100)
PLATELET # BLD AUTO: 382 K/UL — SIGNIFICANT CHANGE UP (ref 150–400)
POTASSIUM SERPL-MCNC: 4.4 MMOL/L — SIGNIFICANT CHANGE UP (ref 3.5–5.3)
POTASSIUM SERPL-SCNC: 4.4 MMOL/L — SIGNIFICANT CHANGE UP (ref 3.5–5.3)
RBC # BLD: 3.96 M/UL — LOW (ref 4.2–5.8)
RBC # FLD: 12.8 % — SIGNIFICANT CHANGE UP (ref 10.3–16.9)
SODIUM SERPL-SCNC: 140 MMOL/L — SIGNIFICANT CHANGE UP (ref 135–145)
WBC # BLD: 6.6 K/UL — SIGNIFICANT CHANGE UP (ref 3.8–10.5)
WBC # FLD AUTO: 6.6 K/UL — SIGNIFICANT CHANGE UP (ref 3.8–10.5)

## 2018-11-26 PROCEDURE — 80076 HEPATIC FUNCTION PANEL: CPT

## 2018-11-26 PROCEDURE — 71046 X-RAY EXAM CHEST 2 VIEWS: CPT

## 2018-11-26 PROCEDURE — 85379 FIBRIN DEGRADATION QUANT: CPT

## 2018-11-26 PROCEDURE — 87798 DETECT AGENT NOS DNA AMP: CPT

## 2018-11-26 PROCEDURE — 80053 COMPREHEN METABOLIC PANEL: CPT

## 2018-11-26 PROCEDURE — 80307 DRUG TEST PRSMV CHEM ANLYZR: CPT

## 2018-11-26 PROCEDURE — 87581 M.PNEUMON DNA AMP PROBE: CPT

## 2018-11-26 PROCEDURE — 80048 BASIC METABOLIC PNL TOTAL CA: CPT

## 2018-11-26 PROCEDURE — 82553 CREATINE MB FRACTION: CPT

## 2018-11-26 PROCEDURE — 99239 HOSP IP/OBS DSCHRG MGMT >30: CPT

## 2018-11-26 PROCEDURE — 84484 ASSAY OF TROPONIN QUANT: CPT

## 2018-11-26 PROCEDURE — C1894: CPT

## 2018-11-26 PROCEDURE — 82550 ASSAY OF CK (CPK): CPT

## 2018-11-26 PROCEDURE — 84443 ASSAY THYROID STIM HORMONE: CPT

## 2018-11-26 PROCEDURE — C1887: CPT

## 2018-11-26 PROCEDURE — C8929: CPT

## 2018-11-26 PROCEDURE — 87880 STREP A ASSAY W/OPTIC: CPT

## 2018-11-26 PROCEDURE — 87081 CULTURE SCREEN ONLY: CPT

## 2018-11-26 PROCEDURE — A9577: CPT

## 2018-11-26 PROCEDURE — 99285 EMERGENCY DEPT VISIT HI MDM: CPT

## 2018-11-26 PROCEDURE — 87040 BLOOD CULTURE FOR BACTERIA: CPT

## 2018-11-26 PROCEDURE — 87633 RESP VIRUS 12-25 TARGETS: CPT

## 2018-11-26 PROCEDURE — 80061 LIPID PANEL: CPT

## 2018-11-26 PROCEDURE — C1769: CPT

## 2018-11-26 PROCEDURE — 90686 IIV4 VACC NO PRSV 0.5 ML IM: CPT

## 2018-11-26 PROCEDURE — 75561 CARDIAC MRI FOR MORPH W/DYE: CPT

## 2018-11-26 PROCEDURE — 87486 CHLMYD PNEUM DNA AMP PROBE: CPT

## 2018-11-26 PROCEDURE — 83735 ASSAY OF MAGNESIUM: CPT

## 2018-11-26 PROCEDURE — 85027 COMPLETE CBC AUTOMATED: CPT

## 2018-11-26 PROCEDURE — 83036 HEMOGLOBIN GLYCOSYLATED A1C: CPT

## 2018-11-26 PROCEDURE — 36415 COLL VENOUS BLD VENIPUNCTURE: CPT

## 2018-11-26 RX ORDER — COLCHICINE 0.6 MG
1 TABLET ORAL
Qty: 30 | Refills: 0 | OUTPATIENT
Start: 2018-11-26 | End: 2018-12-25

## 2018-11-26 RX ORDER — METOPROLOL TARTRATE 50 MG
0.5 TABLET ORAL
Qty: 15 | Refills: 0 | OUTPATIENT
Start: 2018-11-26 | End: 2018-12-25

## 2018-11-26 RX ORDER — PANTOPRAZOLE SODIUM 20 MG/1
40 TABLET, DELAYED RELEASE ORAL
Qty: 0 | Refills: 0 | Status: DISCONTINUED | OUTPATIENT
Start: 2018-11-26 | End: 2018-11-26

## 2018-11-26 RX ORDER — IBUPROFEN 200 MG
1 TABLET ORAL
Qty: 30 | Refills: 0 | OUTPATIENT
Start: 2018-11-26 | End: 2018-12-05

## 2018-11-26 RX ORDER — AZITHROMYCIN 500 MG/1
1 TABLET, FILM COATED ORAL
Qty: 0 | Refills: 0 | COMMUNITY

## 2018-11-26 RX ORDER — LISINOPRIL 2.5 MG/1
1 TABLET ORAL
Qty: 30 | Refills: 0 | OUTPATIENT
Start: 2018-11-26 | End: 2018-12-25

## 2018-11-26 RX ORDER — PANTOPRAZOLE SODIUM 20 MG/1
1 TABLET, DELAYED RELEASE ORAL
Qty: 30 | Refills: 0 | OUTPATIENT
Start: 2018-11-26 | End: 2018-12-25

## 2018-11-26 RX ORDER — METOPROLOL TARTRATE 50 MG
12.5 TABLET ORAL DAILY
Qty: 0 | Refills: 0 | Status: DISCONTINUED | OUTPATIENT
Start: 2018-11-26 | End: 2018-11-26

## 2018-11-26 RX ADMIN — INFLUENZA VIRUS VACCINE 0.5 MILLILITER(S): 15; 15; 15; 15 SUSPENSION INTRAMUSCULAR at 10:56

## 2018-11-26 RX ADMIN — PANTOPRAZOLE SODIUM 40 MILLIGRAM(S): 20 TABLET, DELAYED RELEASE ORAL at 10:50

## 2018-11-26 RX ADMIN — LISINOPRIL 2.5 MILLIGRAM(S): 2.5 TABLET ORAL at 09:59

## 2018-11-26 RX ADMIN — Medication 1 TABLET(S): at 05:51

## 2018-11-26 RX ADMIN — Medication 0.6 MILLIGRAM(S): at 11:00

## 2018-11-26 RX ADMIN — Medication 600 MILLIGRAM(S): at 05:51

## 2018-11-26 RX ADMIN — Medication 6.25 MILLIGRAM(S): at 05:51

## 2018-11-26 RX ADMIN — Medication 600 MILLIGRAM(S): at 13:36

## 2018-11-26 NOTE — DISCHARGE NOTE ADULT - CARE PLAN
Principal Discharge DX:	Myopericarditis  Goal:	MD follow-up, Medication compliance  Assessment and plan of treatment:	See Dr. Vivas on 11/27/18 at 3:50PM. Dr. Vivas will give you clearance to fly to [place] on 11/30/18 if all is well. See cardiologist Dr. Emmanuel Grier between 12/3/18 and 12/5/18.  Continue Colchicine and Ibuprofen as directed.   If chest pain, shortness of breath, dizziness noted call MD immediately and seek medical attention as directed     Monitor right wrist/arm for swelling, bleeding, discharge, pain or skin discoloration if any of these findings are noted go to nearest ER, seek medical attention immediately and call [phone] if any questions.  Secondary Diagnosis:	Cardiomyopathy  Assessment and plan of treatment:	Ejection fraction improved to 57% by Cardiac MRI. Prescribed Toprol XL (Metoprolol) and Lisinopril. Can stagger these medications taking one in the morning and one in the evening. If dizziness or lightheadedness is noted gently sit or lay down where safe, call MD immediately, and seek medical attention as directed  Secondary Diagnosis:	Strep throat  Assessment and plan of treatment:	Completed 3 days of Augmentin. Prescribed an additional 7 days. Total of 10 days. If fever or chills, worsening sore throat or cough noted call MD immediately and seek medical attention as directed  Secondary Diagnosis:	Transaminitis  Assessment and plan of treatment:	One of your liver function blood tests was elevated and is improved. Repeat Liver Function Blood Tests on follow-up with Primary Doctor. Avoid alcohol use Principal Discharge DX:	Myopericarditis  Goal:	MD follow-up, Medication compliance  Assessment and plan of treatment:	See Dr. Vivas on 11/27/18 at 3:50PM. Dr. Vivas will give you clearance to fly to [place] on 11/30/18 if all is well. See cardiologist Dr. Emmanuel Grier between 12/3/18 and 12/5/18.  Continue Colchicine and Ibuprofen as directed.   You are not cleared to exercise or return to work or undertake exertional activity such as running or intimacy for 1 month at this time and until clearance by your cardiologist.    If chest pain, shortness of breath, dizziness noted call MD immediately and seek medical attention as directed.   Monitor right wrist/arm for swelling, bleeding, discharge, pain or skin discoloration if any of these findings are noted go to nearest ER, seek medical attention immediately and call [phone] if any questions.  Secondary Diagnosis:	Cardiomyopathy  Assessment and plan of treatment:	Ejection fraction improved to 57% by Cardiac MRI. Prescribed Toprol XL (Metoprolol) and Lisinopril. Can stagger these medications taking one in the morning and one in the evening. If dizziness or lightheadedness is noted gently sit or lay down where safe, call MD immediately, and seek medical attention as directed  Secondary Diagnosis:	Strep throat  Assessment and plan of treatment:	Completed 3 days of Augmentin. Prescribed an additional 7 days. Total of 10 days. If fever or chills, worsening sore throat or cough noted call MD immediately and seek medical attention as directed  Secondary Diagnosis:	Transaminitis  Assessment and plan of treatment:	One of your liver function blood tests was elevated and is improved. Repeat Liver Function Blood Tests on follow-up with Primary Doctor. Avoid alcohol use

## 2018-11-26 NOTE — DIETITIAN INITIAL EVALUATION ADULT. - PROBLEM SELECTOR PLAN 1
- currently chest pain free  - trop T 0.98 > 0.96.   - EKG NSR with diffuse J point elevation with TWI in AVL and V1.   - pt for cardiac catheterization on 11/23/18 with Dr Delaney. No load.   - Echo 11/23/18 Normal LV size and wall thickness. Mild apical lateral mid to mid anterolateral wall hypokinesis. EF 50%. LA/RA/RV normal. Trace to mild TR. PASP 20mmHg. No pericardial effusion.   -pt s/p diagnostic cardiac cath on 11/23/18 showing normal coronaries. EDP 14mg. Right radial access  - first utox (130pm) was indeterminate 2/2 urine pH being elevated. Repeat study ordered.  -Start Ibuprofen 600mg TID and Colchicine 0.6mg daily (pt is less than 70kg).

## 2018-11-26 NOTE — DISCHARGE NOTE ADULT - PROVIDER TOKENS
TOKEN:'[id number]' TOKEN:'[id number]',FREE:[LAST:[Marvel],FIRST:[Emmanuel],PHONE:[phone]],FAX:[(   )    -],ADDRESS:[Address: [address] [place] [id number]]]

## 2018-11-26 NOTE — DISCHARGE NOTE ADULT - MEDICATION SUMMARY - MEDICATIONS TO STOP TAKING
I will STOP taking the medications listed below when I get home from the hospital:    Zithromax TRI-MEHRAN 500 mg oral tablet  -- 1 tab(s) by mouth once a day

## 2018-11-26 NOTE — DISCHARGE NOTE ADULT - MEDICATION SUMMARY - MEDICATIONS TO TAKE
I will START or STAY ON the medications listed below when I get home from the hospital:    ibuprofen 600 mg oral tablet  -- 1 tab(s) by mouth 3 times a day  -- Indication: For Myopericarditis    lisinopril 2.5 mg oral tablet  -- 1 tab(s) by mouth once a day  -- Indication: For Cardiomyopathy    colchicine 0.6 mg oral tablet  -- 1 tab(s) by mouth once a day  -- Indication: For Myopericarditis    Toprol-XL 25 mg oral tablet, extended release  -- 0.5 tab(s) by mouth once a day   -- Indication: For Cardiomyopathy    Augmentin 875 mg-125 mg oral tablet  -- 1 tab(s) by mouth 2 times a day  -- Indication: For Strep throat    Protonix 40 mg oral delayed release tablet  -- 1 tab(s) by mouth once a day (before a meal)  -- Indication: For Stomach Protection

## 2018-11-26 NOTE — DIETITIAN INITIAL EVALUATION ADULT. - NS AS NUTRI INTERV ED CONTENT
Purpose of the nutrition education/Nutrition relationship to health/disease/Fluid restricted diet modifications, TLC/heart healthy diet, alcohol consumption recommendations/Other (specify)/Priority modifications/Recommended modifications Fluid restricted diet modifications, TLC/heart healthy diet, alcohol consumption recommendations

## 2018-11-26 NOTE — DISCHARGE NOTE ADULT - CARE PROVIDER_API CALL
Cuauhtemoc Vivas), Internal Medicine  [address] [id number]  Phone: [phone]  Fax: [phone] Cuauhtemoc Vivas), Internal Medicine  [address] [id number]  Phone: [phone]  Fax: [phone]    Emmanuel Grier  Address: [address] [place] [address]  Phone: [phone]  Fax: (       -

## 2018-11-26 NOTE — DISCHARGE NOTE ADULT - PATIENT PORTAL LINK FT
You can access the [email or web address] Portal, offered by [facility], by registering with the following website: [email or web address]

## 2018-11-26 NOTE — DIETITIAN INITIAL EVALUATION ADULT. - PROBLEM SELECTOR PLAN 2
-As per Dr Shepard, given wall motion abnormalities pt is classified as having acute diastolic heart failure. Pt euvolemic, doesn't require diuretics at this time.   - Pt having frequent PVCs on telemetry. Started on Lopressor 12.5mg BID as pt's SBPs in 100s. If pt tolerates Lopressor can start Lisinopril 2.5mg or 5mg daily on 11/24/18 afternoon depending on BP

## 2018-11-26 NOTE — DIETITIAN INITIAL EVALUATION ADULT. - OTHER INFO
24 yo M with PMHx  innocent heart murmur, small PFO, with recent strep throat infections. Admitted for chest pain, 22 yo M with PMHx  innocent heart murmur, small PFO, with recent strep throat infections. Admitted for chest pain, elevated troponin, s/p cardiac catheterization, Dx of acute diastolic CHF, myopericarditis, and asymptomatic strep throat. Pt visited while awake, alert. Pt reported intentional weight loss since April by limiting beer intake. Pt stated diet history, reported regular diet of 3 meals x day and buying fast food for lunch often. Pt reported recent addition of Vitamin C supplements to his diet regimen. Pt stated consumption of 50% of meals since admission due to lack of appetite. Pt accepted heart-healthy diet education and expressed comprehension of education given. Skin intact. NKFA. 22 yo M with PMHx  innocent heart murmur, small PFO, with recent strep throat infections. Admitted for chest pain, elevated troponin, s/p cardiac catheterization, Dx of acute diastolic CHF, myopericarditis, and asymptomatic strep throat. Pt visited while awake, alert. Pt reported intentional weight loss since April by limiting beer intake. Pt stated diet history, reported regular diet of 3 meals x day and buying fast food for lunch often. Pt reported recent addition of Vitamin C supplements to his diet regimen. Pt endorses fair appetite, good >75% PO intake of meals. Pt accepted heart-healthy diet education and expressed comprehension of education given. Skin intact. NKFA. Denies N/V/D/C or pain.

## 2018-11-26 NOTE — DISCHARGE NOTE ADULT - HOSPITAL COURSE
22 y/o male, visiting from [place], h/o PFO, h/o recurrent strep throat, who presented w/ C/P and sore throat noted to have myopericarditis, cardiomyopathy, w/ strep throat Group C on ABX, s/p cardiac cath 11/23/18 revealing normal coronaries, confirmed to have myocarditis by cardiac MRI 11/25/18, currently asymptomatic on medical regimen for myocarditis. Follow-up appointment tomorrow w/ Dr. Vivas.     Plan:  Myopericarditis.    C/P free  Troponin down trended  Continue Ibuprofen 600 mg orally TID  Continue Colchicine 0.6 mg daily (pt is less than 70 kg)  Patient educated not to exercise/return to work/have exertional activity until clearance by his cardiologist  Protonix 40mg daily for GI protection    Cardiomyopathy  BP 90s-100s/50s at baseline. Asymptomatic   Change BB to Toprol XL 12.5mg daily  Continue Lisinopril 2.5mg daily  Can stagger BP medications   No CHF. Euvolemic   No further non-sustained VT on tele  EF improved to 57% on Cardiac MRI    Strep throat.    Completed 3 days of Augmentin 875/125 mg BID, prescribed for 7 additional days     Tranasaminits  AST improved. ALT normal      Dispo: stable for D/C home as discussed w/ Dr. Shepard. See Dr. Vivas on 11/27/18 at 3:50PM  Dr. Vivas will give patient clearance to fly to [place] on 11/30/18. Patient to see cardiologist Dr. Emmanuel Grier on return to [place] between 12/3/18 and 12/5/18.    Pt seen and examined bedside.  Patient denies C/P, SOB, N/V, dizziness, palpitations, and diaphoresis.  Pt denies fever/chills, dysuria, abdominal pain, diarrhea, and cough  	  V/S 	BP:  /60s	    HR: 80s   RR: 16	T: 98	O2: 98% RA   	  GEN: NAD  PULM:  CTA B/L  CARD: No JVD B/L, RRR, S1 and S2   ABD: +BS, NT, soft/ND	  EXT: No Edema B/L LE  R WRIST: soft, no hematoma, no bleeding, radial pulse 2+  NEURO: A+Ox3, no focal deficit                        11.8   6.6   )-----------( 382      ( 26 Nov 2018 06:24 )             36.0     11-26    140  |  107  |  23  ----------------------------<  108<H>  4.4   |  24  |  0.68    Ca    8.7      26 Nov 2018 06:24  Mg     2.3     11-26    TPro  6.8  /  Alb  3.9  /  TBili  0.4  /  DBili  <0.2  /  AST  80<H>  /  ALT  24  /  AlkPhos  41  11-25    Studies:  Troponin T 0.98 to 0.96    Echo 11/23/18: EF 50%, trace to mild TR, anterolateral / apical-lateral wall mild hypokinesis, no pericardial effusion.     Cardiac MRI 11/25/18 : LVEF 57%, RVEF 51%, LVF/RV moderately dilated, LA midly dilated, on delayed enhancement imaging, there is subepicardial to midwall  hyperenhancement of the base to apical inferolateral and mid to distal anterolateral segments consistent with an infectious/inflammatory process such as myocarditis.     EKG showed diffuse ST elevation   RVP negative  D-Dimer negative   Blood Cx negative   to 80

## 2018-11-26 NOTE — DIETITIAN INITIAL EVALUATION ADULT. - ENERGY NEEDS
Height: 5'7" Weight: 65.7 kg,  lbs+/-10%, %IBW 97%, BMI 22.7,   ABW used to calculate EER as per pt's current body weight is within % IBW   Estimated nutrient needs based on [facility] SOC for maintenance in adults  FR 1500 mL/d per team 2/2 CHF

## 2018-11-26 NOTE — DISCHARGE NOTE ADULT - PLAN OF CARE
MD follow-up, Medication compliance See Dr. Vivas on 11/27/18 at 3:50PM. Dr. Vivas will give you clearance to fly to [place] on 11/30/18 if all is well. See cardiologist Dr. Emmanuel Grier between 12/3/18 and 12/5/18.  Continue Colchicine and Ibuprofen as directed.   If chest pain, shortness of breath, dizziness noted call MD immediately and seek medical attention as directed     Monitor right wrist/arm for swelling, bleeding, discharge, pain or skin discoloration if any of these findings are noted go to nearest ER, seek medical attention immediately and call [phone] if any questions. Ejection fraction improved to 57% by Cardiac MRI. Prescribed Toprol XL (Metoprolol) and Lisinopril. Can stagger these medications taking one in the morning and one in the evening. If dizziness or lightheadedness is noted gently sit or lay down where safe, call MD immediately, and seek medical attention as directed Completed 3 days of Augmentin. Prescribed an additional 7 days. Total of 10 days. If fever or chills, worsening sore throat or cough noted call MD immediately and seek medical attention as directed One of your liver function blood tests was elevated and is improved. Repeat Liver Function Blood Tests on follow-up with Primary Doctor. Avoid alcohol use See Dr. Vivas on 11/27/18 at 3:50PM. Dr. Vivas will give you clearance to fly to [place] on 11/30/18 if all is well. See cardiologist Dr. Emmanuel Grier between 12/3/18 and 12/5/18.  Continue Colchicine and Ibuprofen as directed.   You are not cleared to exercise or return to work or undertake exertional activity such as running or intimacy for 1 month at this time and until clearance by your cardiologist.    If chest pain, shortness of breath, dizziness noted call MD immediately and seek medical attention as directed.   Monitor right wrist/arm for swelling, bleeding, discharge, pain or skin discoloration if any of these findings are noted go to nearest ER, seek medical attention immediately and call [phone] if any questions.

## 2018-11-26 NOTE — DISCHARGE NOTE ADULT - CARE PROVIDERS DIRECT ADDRESSES
,[email or web address]

## 2018-11-26 NOTE — DIETITIAN INITIAL EVALUATION ADULT. - NUTRITIONGOAL OUTCOME1
To adhere to modified diet and meet nutritional needs. list 4 tips/strategies to reduce Na intake and manage fluids

## 2018-11-27 ENCOUNTER — APPOINTMENT (OUTPATIENT)
Dept: HEART AND VASCULAR | Facility: CLINIC | Age: 23
End: 2018-11-27
Payer: COMMERCIAL

## 2018-11-27 VITALS
DIASTOLIC BLOOD PRESSURE: 46 MMHG | OXYGEN SATURATION: 98 % | HEIGHT: 67.32 IN | HEART RATE: 61 BPM | WEIGHT: 145 LBS | TEMPERATURE: 97.8 F | BODY MASS INDEX: 22.49 KG/M2 | SYSTOLIC BLOOD PRESSURE: 90 MMHG

## 2018-11-27 DIAGNOSIS — Z82.49 FAMILY HISTORY OF ISCHEMIC HEART DISEASE AND OTHER DISEASES OF THE CIRCULATORY SYSTEM: ICD-10-CM

## 2018-11-27 DIAGNOSIS — F17.290 NICOTINE DEPENDENCE, OTHER TOBACCO PRODUCT, UNCOMPLICATED: ICD-10-CM

## 2018-11-27 DIAGNOSIS — Z78.9 OTHER SPECIFIED HEALTH STATUS: ICD-10-CM

## 2018-11-27 DIAGNOSIS — Z87.891 PERSONAL HISTORY OF NICOTINE DEPENDENCE: ICD-10-CM

## 2018-11-27 DIAGNOSIS — Z87.898 PERSONAL HISTORY OF OTHER SPECIFIED CONDITIONS: ICD-10-CM

## 2018-11-27 PROCEDURE — 99401 PREV MED CNSL INDIV APPRX 15: CPT | Mod: 25

## 2018-11-27 PROCEDURE — 93000 ELECTROCARDIOGRAM COMPLETE: CPT

## 2018-11-27 PROCEDURE — 99215 OFFICE O/P EST HI 40 MIN: CPT

## 2018-11-27 RX ORDER — METOPROLOL SUCCINATE 25 MG/1
25 TABLET, EXTENDED RELEASE ORAL DAILY
Qty: 1 | Refills: 0 | Status: ACTIVE | COMMUNITY

## 2018-11-27 RX ORDER — AMOXICILLIN AND CLAVULANATE POTASSIUM 875; 125 MG/1; 1/1
875-125 TABLET, FILM COATED ORAL
Qty: 2 | Refills: 0 | Status: ACTIVE | COMMUNITY

## 2018-11-27 RX ORDER — COLCHICINE 0.6 MG/1
0.6 TABLET ORAL DAILY
Qty: 1 | Refills: 0 | Status: ACTIVE | COMMUNITY

## 2018-11-27 RX ORDER — LISINOPRIL 2.5 MG/1
2.5 TABLET ORAL DAILY
Qty: 1 | Refills: 0 | Status: ACTIVE | COMMUNITY

## 2018-11-27 RX ORDER — PANTOPRAZOLE SODIUM 40 MG/1
40 TABLET, DELAYED RELEASE ORAL DAILY
Qty: 1 | Refills: 0 | Status: ACTIVE | COMMUNITY

## 2018-11-27 NOTE — HISTORY OF PRESENT ILLNESS
[FreeTextEntry1] : 22 y/o male, visiting from [place], h/o PFO, h/o recurrent strep throat, who \par presented w/ C/P and sore throat noted to have myopericarditis, cardiomyopathy, \par w/ strep throat Group C on ABX, s/p cardiac cath 11/23/18 revealing normal \par coronaries, confirmed to have myocarditis by cardiac MRI 11/25/18, currently \par asymptomatic on medical regimen for myocarditis.

## 2018-11-27 NOTE — PHYSICAL EXAM
[General Appearance - Well Developed] : well developed [Normal Appearance] : normal appearance [Well Groomed] : well groomed [General Appearance - Well Nourished] : well nourished [No Deformities] : no deformities [General Appearance - In No Acute Distress] : no acute distress [Normal Conjunctiva] : the conjunctiva exhibited no abnormalities [Eyelids - No Xanthelasma] : the eyelids demonstrated no xanthelasmas [Normal Oral Mucosa] : normal oral mucosa [No Oral Pallor] : no oral pallor [No Oral Cyanosis] : no oral cyanosis [Normal Jugular Venous A Waves Present] : normal jugular venous A waves present [Normal Jugular Venous V Waves Present] : normal jugular venous V waves present [No Jugular Venous Pickett A Waves] : no jugular venous pickett A waves [Respiration, Rhythm And Depth] : normal respiratory rhythm and effort [Exaggerated Use Of Accessory Muscles For Inspiration] : no accessory muscle use [Auscultation Breath Sounds / Voice Sounds] : lungs were clear to auscultation bilaterally [Heart Rate And Rhythm] : heart rate and rhythm were normal [Heart Sounds] : normal S1 and S2 [Murmurs] : no murmurs present [Abdomen Soft] : soft [Abdomen Tenderness] : non-tender [Abdomen Mass (___ Cm)] : no abdominal mass palpated [Abnormal Walk] : normal gait [Gait - Sufficient For Exercise Testing] : the gait was sufficient for exercise testing [Nail Clubbing] : no clubbing of the fingernails [Cyanosis, Localized] : no localized cyanosis [Petechial Hemorrhages (___cm)] : no petechial hemorrhages [Skin Color & Pigmentation] : normal skin color and pigmentation [] : no rash [No Venous Stasis] : no venous stasis [Skin Lesions] : no skin lesions [No Skin Ulcers] : no skin ulcer [No Xanthoma] : no  xanthoma was observed [Oriented To Time, Place, And Person] : oriented to person, place, and time [Affect] : the affect was normal [Mood] : the mood was normal [No Anxiety] : not feeling anxious

## 2018-11-27 NOTE — REASON FOR VISIT
[Follow-Up - From Hospitalization] : follow-up of a recent hospitalization for [Chest Pain] : chest pain [Discharge Date: ___] : Discharge Date: [unfilled]

## 2018-11-27 NOTE — DISCUSSION/SUMMARY
[FreeTextEntry1] : The number of diagnostic and/or management options \par Myopericarditis. \par C/P free \par Continue Ibuprofen 600 mg orally TID \par Continue Colchicine 0.6 mg daily (pt is less than 70 kg) \par Protonix 40mg daily for GI protection \par \par Cardiomyopathy \par BP 90s-100s/50s at baseline. Asymptomatic \par Toprol XL 12.5mg daily \par Continue Lisinopril 2.5mg daily \par Can stagger BP medications \par No CHF. Euvolemic \par EF improved to 57% on Cardiac MRI \par \par Strep throat. \par Complete Augmentin 875/125 mg BID, prescribed for 7 additional days \par \par Tranasaminits \par AST improved. ALT normal \par \par Dispo: stable for clearance to fly to [place] on 11/30/18. Patient to see \par cardiologist Dr. Emmanuel Grier on return to [place] between 12/3/18 and \par 12/5/18. \par \par Pt seen and examined bedside. \par Patient denies C/P, SOB, N/V, dizziness, palpitations, and diaphoresis. \par Pt denies fever/chills, dysuria, abdominal pain, diarrhea, and cough \par \par \par \par Labs, radiology: ekg cath mri\par \par Discussion of the case with another healthcare provider: brown\par \par Overall Risk: High\par  \par 15min additional detailed discussion regarding prevention including but not limiting to goal SBP<130mmHg, Mediterranean diet discussion, No salt diet, diabetes screening, and goal LDL<100. Smoking cessation, EtOH use and depression screen where applicable\par Exercise counseling and plan discussed with patient\par will readdress and reinforce prevention in subsequent visits

## 2018-11-28 LAB
CULTURE RESULTS: SIGNIFICANT CHANGE UP
CULTURE RESULTS: SIGNIFICANT CHANGE UP
SPECIMEN SOURCE: SIGNIFICANT CHANGE UP
SPECIMEN SOURCE: SIGNIFICANT CHANGE UP

## 2018-12-02 DIAGNOSIS — I30.9 ACUTE PERICARDITIS, UNSPECIFIED: ICD-10-CM

## 2018-12-02 DIAGNOSIS — I43 CARDIOMYOPATHY IN DISEASES CLASSIFIED ELSEWHERE: ICD-10-CM

## 2018-12-02 DIAGNOSIS — I47.2 VENTRICULAR TACHYCARDIA: ICD-10-CM

## 2018-12-02 DIAGNOSIS — J02.0 STREPTOCOCCAL PHARYNGITIS: ICD-10-CM

## 2018-12-02 DIAGNOSIS — I50.31 ACUTE DIASTOLIC (CONGESTIVE) HEART FAILURE: ICD-10-CM

## 2018-12-02 DIAGNOSIS — I49.3 VENTRICULAR PREMATURE DEPOLARIZATION: ICD-10-CM

## 2023-11-28 NOTE — DISCHARGE NOTE ADULT - BECAUSE OF A PHYSICAL, MENTAL OR EMOTIONAL CONDITION, DO YOU HAVE DIFFICULTY DOING  [place] ALONE LIKE VISITING A DOCTOR'S OFFICE OR SHOPPING (15 YEARS AND OLDER)
Detail Level: Zone
Hide Include Location In Plan Question?: No
Additional Note: Reassured benign. discussed with Pt can have removed cosmetically. Procedure is not covered by insurance and consider cosmetic. Pt has been informed of $150 x15 lesions.
No